# Patient Record
Sex: MALE | Race: BLACK OR AFRICAN AMERICAN | URBAN - METROPOLITAN AREA
[De-identification: names, ages, dates, MRNs, and addresses within clinical notes are randomized per-mention and may not be internally consistent; named-entity substitution may affect disease eponyms.]

---

## 2017-03-02 ENCOUNTER — TELEPHONE (OUTPATIENT)
Dept: INTERNAL MEDICINE CLINIC | Age: 37
End: 2017-03-02

## 2017-03-02 NOTE — TELEPHONE ENCOUNTER
Pt calling asking RM to call him. Says he had a lab test about a year ago and then had a retest for his a1c. He is now filing for disability and the tests are coming up in question. He is wanting to know if this result needs to be on the record. Please call him to discuss.

## 2017-03-03 NOTE — TELEPHONE ENCOUNTER
Not filing for disability . 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 He is listed as prediabetic but says he has not been diagnosed with diabetes. HE has only had an A1C done.  Please advise

## 2017-03-07 ENCOUNTER — TELEPHONE (OUTPATIENT)
Dept: INTERNAL MEDICINE CLINIC | Age: 37
End: 2017-03-07

## 2017-03-07 NOTE — TELEPHONE ENCOUNTER
Pt called to give info to RM, letter needs to be faxed to attn:  Zena Edmondson at [facility] for [employer] fax# [phone]

## 2018-01-24 ENCOUNTER — OFFICE VISIT (OUTPATIENT)
Dept: INTERNAL MEDICINE CLINIC | Age: 38
End: 2018-01-24

## 2018-01-24 ENCOUNTER — HOSPITAL ENCOUNTER (OUTPATIENT)
Dept: LAB | Age: 38
Discharge: HOME | End: 2018-01-24
Payer: COMMERCIAL

## 2018-01-24 VITALS
DIASTOLIC BLOOD PRESSURE: 78 MMHG | BODY MASS INDEX: 23.51 KG/M2 | RESPIRATION RATE: 12 BRPM | HEART RATE: 65 BPM | WEIGHT: 164.2 LBS | SYSTOLIC BLOOD PRESSURE: 138 MMHG | TEMPERATURE: 98.7 F | OXYGEN SATURATION: 99 % | HEIGHT: 70 IN

## 2018-01-24 DIAGNOSIS — R10.11 RIGHT UPPER QUADRANT PAIN: ICD-10-CM

## 2018-01-24 DIAGNOSIS — R10.31 RIGHT LOWER QUADRANT PAIN: ICD-10-CM

## 2018-01-24 DIAGNOSIS — R10.31 RIGHT LOWER QUADRANT PAIN: Primary | ICD-10-CM

## 2018-01-24 LAB
ALBUMIN SERPL-MCNC: 4 G/DL (ref 3.4–5)
ALBUMIN/GLOB SERPL: 1 {RATIO} (ref 0.8–1.7)
ALP SERPL-CCNC: 81 U/L (ref 45–117)
ALT SERPL-CCNC: 20 U/L (ref 16–61)
ANION GAP SERPL CALC-SCNC: 8 MMOL/L (ref 3–18)
AST SERPL-CCNC: 18 U/L (ref 15–37)
BASOPHILS # BLD: 0 K/UL (ref 0–0.06)
BASOPHILS NFR BLD: 1 % (ref 0–2)
BILIRUB SERPL-MCNC: 0.3 MG/DL (ref 0.2–1)
BUN SERPL-MCNC: 13 MG/DL (ref 7–18)
BUN/CREAT SERPL: 10 (ref 12–20)
CALCIUM SERPL-MCNC: 8.8 MG/DL (ref 8.5–10.1)
CHLORIDE SERPL-SCNC: 102 MMOL/L (ref 100–108)
CO2 SERPL-SCNC: 29 MMOL/L (ref 21–32)
CREAT SERPL-MCNC: 1.25 MG/DL (ref 0.6–1.3)
DIFFERENTIAL METHOD BLD: NORMAL
EOSINOPHIL # BLD: 0.1 K/UL (ref 0–0.4)
EOSINOPHIL NFR BLD: 2 % (ref 0–5)
ERYTHROCYTE [DISTWIDTH] IN BLOOD BY AUTOMATED COUNT: 13.7 % (ref 11.6–14.5)
GLOBULIN SER CALC-MCNC: 4.1 G/DL (ref 2–4)
GLUCOSE SERPL-MCNC: 139 MG/DL (ref 74–99)
HCT VFR BLD AUTO: 42.9 % (ref 36–48)
HGB BLD-MCNC: 14 G/DL (ref 13–16)
LYMPHOCYTES # BLD: 1.7 K/UL (ref 0.9–3.6)
LYMPHOCYTES NFR BLD: 35 % (ref 21–52)
MCH RBC QN AUTO: 28.3 PG (ref 24–34)
MCHC RBC AUTO-ENTMCNC: 32.6 G/DL (ref 31–37)
MCV RBC AUTO: 86.7 FL (ref 74–97)
MONOCYTES # BLD: 0.4 K/UL (ref 0.05–1.2)
MONOCYTES NFR BLD: 8 % (ref 3–10)
NEUTS SEG # BLD: 2.7 K/UL (ref 1.8–8)
NEUTS SEG NFR BLD: 54 % (ref 40–73)
PLATELET # BLD AUTO: 282 K/UL (ref 135–420)
PMV BLD AUTO: 10.2 FL (ref 9.2–11.8)
POTASSIUM SERPL-SCNC: 3.8 MMOL/L (ref 3.5–5.5)
PROT SERPL-MCNC: 8.1 G/DL (ref 6.4–8.2)
RBC # BLD AUTO: 4.95 M/UL (ref 4.7–5.5)
SODIUM SERPL-SCNC: 139 MMOL/L (ref 136–145)
WBC # BLD AUTO: 5 K/UL (ref 4.6–13.2)

## 2018-01-24 PROCEDURE — 85025 COMPLETE CBC W/AUTO DIFF WBC: CPT | Performed by: INTERNAL MEDICINE

## 2018-01-24 PROCEDURE — 80053 COMPREHEN METABOLIC PANEL: CPT | Performed by: INTERNAL MEDICINE

## 2018-01-24 NOTE — MR AVS SNAPSHOT
[address] [place] [address] [facility] [address] [facility] 
[phone] Patient: Dana Vernon MRN: [id number] :1980 Visit Information Date & Time Provider Department Dept. Phone Encounter #  
 2018  2:30 PM Rudi Parker MD [facility] [phone] Your Appointments 2018  2:30 PM  
Office Visit with Rudi Parker MD  
[facility] [address] [place] [address]) Appt Note: ABDOMINAL PAIN  
 [address] [facility], [facility] [address] [address] [facility] [address] [place]  
  
   
 [address] [facility]  
  
    
 2018  9:10 AM  
Nurse Visit with Jabier CASILLAS POD4 NURSE [facility] ([address] [facility]) Appt Note: SA Dropoff #1  
 [address] [place] [address]  
[phone]  
  
   
 [place] [id number] Upcoming Health Maintenance Date Due DTaP/Tdap/Td series (2 - Td) 2025 Allergies as of 2018  Review Complete On: 2018 By: Rudi Parker MD  
 No Known Allergies Current Immunizations  Never Reviewed Name Date Influenza Vaccine PF 10/22/2014  9:32 AM  
 TB Skin Test (PPD) Intradermal 2014, 9/15/2014  9:58 AM  
  
 Not reviewed this visit You Were Diagnosed With   
  
 Codes Comments Right lower quadrant pain    -  Primary ICD-10-CM: R10.31 ICD-9-CM: 789.03 Right upper quadrant pain     ICD-10-CM: R10.11 ICD-9-CM: 789.01 Vitals BP Pulse Temp Resp Height(growth percentile) Weight(growth percentile) 138/78 65 98.7 °F (37.1 °C) (Oral) 12 5' 10\" (1.778 m) 164 lb 3.2 oz (74.5 kg) SpO2 BMI Smoking Status 99% 23.56 kg/m2 Never Smoker Vitals History BMI and BSA Data Body Mass Index Body Surface Area  
 23.56 kg/m 2 1.92 m 2 Your Updated Medication List  
  
Notice  As of 2018  2:01 PM  
 You have not been prescribed any medications. To-Do List   
 Around 01/24/2018 Lab:  CBC WITH AUTOMATED DIFF Around 01/24/2018 Lab:  METABOLIC PANEL, COMPREHENSIVE Introducing [facility]! [facility] introduces [employer] patient portal. Now you can access parts of your medical record, email your doctor's office, and request medication refills online. 1. In your internet browser, go to [email or web address] 2. Click on the First Time User? Click Here link in the Sign In box. You will see the New Member Sign Up page. 3. Enter your [employer] Access Code exactly as it appears below. You will not need to use this code after youve completed the sign-up process. If you do not sign up before the expiration date, you must request a new code. · [employer] Access Code: [email or web address] Expires: 4/24/2018  1:36 PM 
 
4. Enter the last four digits of your Social Security Number (xxxx) and Date of Birth (mm/dd/yyyy) as indicated and click Submit. You will be taken to the next sign-up page. 5. Create a [employer] ID. This will be your [employer] login ID and cannot be changed, so think of one that is secure and easy to remember. 6. Create a [employer] password. You can change your password at any time. 7. Enter your Password Reset Question and Answer. This can be used at a later time if you forget your password. 8. Enter your e-mail address. You will receive e-mail notification when new information is available in [address] 9. Click Sign Up. You can now view and download portions of your medical record. 10. Click the Download Summary menu link to download a portable copy of your medical information. If you have questions, please visit the Frequently Asked Questions section of the [employer] website. Remember, [employer] is NOT to be used for urgent needs. For medical emergencies, dial 911. Now available from your iPhone and Android! Please provide this summary of care documentation to your next provider. Your primary care clinician is listed as Andreina Tolliver. Riley Galarza. If you have any questions after today's visit, please call [phone].

## 2018-01-24 NOTE — PROGRESS NOTES
Genet Lau 1980, is a 40 y.o. male, who is seen today for right lower quadrant and right upper quadrant discomfort. Starting around August he has been noticing periodically some discomfort in the right lower quadrant. It is not associated with meals or with bowel movements or anything else that he can come up with. It has not been severe at any time except once. His bowel habits remain normal with no bright red blood per rectum and no lack stools. Since about October he is occasionally noticed some right upper quadrant discomfort perhaps 10 times altogether lasting anywhere from a few minutes to several hours. It has not been severe except on one occasion. It is not associated with eating or bowel movements or activity except one time when he was jumping rope he had rather severe pain in the whole right abdomen. He stopped jumping rope at that time. He had a checkup for work about 2 months ago and blood pressure was 270 systolic at that time. He has gained 9 pounds from when he was weighed 2-1/2 months ago. Past Medical History:   Diagnosis Date    Asthma     Consultation for sterilization      He takes no medication. Visit Vitals    /78    Pulse 65    Temp 98.7 °F (37.1 °C) (Oral)    Resp 12    Ht 5' 10\" (1.778 m)    Wt 164 lb 3.2 oz (74.5 kg)    SpO2 99%    BMI 23.56 kg/m2     Initial blood pressure was 160 when the nurse checked it, it was down to 138/78 when I checked it. The abdomen is not distended and not tympanitic. No hepatosplenomegaly or masses. No tenderness anywhere. Josefine Homans sign is absent. Assessment: Right abdominal pains as noted above first starting in the right lower abdomen and several episodes of fairly mild discomfort in the right upper abdomen lasting from minutes to hours at a time.   This is vague and often not associated with any other findings including weight loss or change in bowel habits etc. that it is most likely related to his abdominal wall rather than anything internally. We will check CBC and CMP today and if this is normal I recommended that he give it some more time and see if this will just clear up on its own but if it is not clearing up over the next 6 or 8 weeks or worsens, I would recommend he see a gastroenterologist for an opinion. 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 This visit lasted 25 minutes, greater than 50% of the time was spent counseling, going over the differential diagnosis and why many intra-abdominal processes seemed not to be present. 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 Maurice Renee MD FACP    Please note: This document has been produced using voice recognition software. Unrecognized errors in transcription may be present.

## 2018-01-25 ENCOUNTER — TELEPHONE (OUTPATIENT)
Dept: INTERNAL MEDICINE CLINIC | Age: 38
End: 2018-01-25

## 2018-01-25 NOTE — TELEPHONE ENCOUNTER
----- Message from Rudi Parker MD sent at 1/25/2018  7:35 AM EST -----  Please notify Dr. Kathy New  that his chemistries are all normal, nonfasting glucose is 139 which is normal and his blood count is normal.

## 2018-01-25 NOTE — PROGRESS NOTES
Please notify Dr. Oh Covert  that his chemistries are all normal, nonfasting glucose is 139 which is normal and his blood count is normal.

## 2018-06-11 ENCOUNTER — TELEPHONE (OUTPATIENT)
Dept: INTERNAL MEDICINE CLINIC | Age: 38
End: 2018-06-11

## 2023-12-26 NOTE — TELEPHONE ENCOUNTER
pls have him schedule an appt, he hasnt been seen since 12/2015, I doubt RM will do any paperwork without a recent visit No